# Patient Record
Sex: MALE | Race: WHITE | NOT HISPANIC OR LATINO | Employment: FULL TIME | URBAN - METROPOLITAN AREA
[De-identification: names, ages, dates, MRNs, and addresses within clinical notes are randomized per-mention and may not be internally consistent; named-entity substitution may affect disease eponyms.]

---

## 2022-08-02 ENCOUNTER — LAB (OUTPATIENT)
Dept: LAB | Facility: HOSPITAL | Age: 37
End: 2022-08-02

## 2022-08-02 ENCOUNTER — OFFICE VISIT (OUTPATIENT)
Dept: FAMILY MEDICINE CLINIC | Age: 37
End: 2022-08-02

## 2022-08-02 VITALS
WEIGHT: 237 LBS | HEART RATE: 86 BPM | HEIGHT: 69 IN | BODY MASS INDEX: 35.1 KG/M2 | DIASTOLIC BLOOD PRESSURE: 87 MMHG | OXYGEN SATURATION: 99 % | SYSTOLIC BLOOD PRESSURE: 126 MMHG

## 2022-08-02 DIAGNOSIS — Z13.220 SCREENING CHOLESTEROL LEVEL: ICD-10-CM

## 2022-08-02 DIAGNOSIS — F41.9 ANXIETY: ICD-10-CM

## 2022-08-02 DIAGNOSIS — R53.83 OTHER FATIGUE: Primary | ICD-10-CM

## 2022-08-02 DIAGNOSIS — R53.83 OTHER FATIGUE: ICD-10-CM

## 2022-08-02 DIAGNOSIS — F17.210 NICOTINE DEPENDENCE, CIGARETTES, UNCOMPLICATED: ICD-10-CM

## 2022-08-02 LAB
ALBUMIN SERPL-MCNC: 4.5 G/DL (ref 3.5–5.2)
ALBUMIN/GLOB SERPL: 2 G/DL
ALP SERPL-CCNC: 87 U/L (ref 39–117)
ALT SERPL W P-5'-P-CCNC: 24 U/L (ref 1–41)
ANION GAP SERPL CALCULATED.3IONS-SCNC: 13.4 MMOL/L (ref 5–15)
AST SERPL-CCNC: 17 U/L (ref 1–40)
BASOPHILS # BLD AUTO: 0.04 10*3/MM3 (ref 0–0.2)
BASOPHILS NFR BLD AUTO: 0.4 % (ref 0–1.5)
BILIRUB SERPL-MCNC: <0.2 MG/DL (ref 0–1.2)
BUN SERPL-MCNC: 12 MG/DL (ref 6–20)
BUN/CREAT SERPL: 12.1 (ref 7–25)
CALCIUM SPEC-SCNC: 9.2 MG/DL (ref 8.6–10.5)
CHLORIDE SERPL-SCNC: 101 MMOL/L (ref 98–107)
CHOLEST SERPL-MCNC: 230 MG/DL (ref 0–200)
CO2 SERPL-SCNC: 25.6 MMOL/L (ref 22–29)
CREAT SERPL-MCNC: 0.99 MG/DL (ref 0.76–1.27)
DEPRECATED RDW RBC AUTO: 39.9 FL (ref 37–54)
EGFRCR SERPLBLD CKD-EPI 2021: 100.6 ML/MIN/1.73
EOSINOPHIL # BLD AUTO: 0.38 10*3/MM3 (ref 0–0.4)
EOSINOPHIL NFR BLD AUTO: 3.9 % (ref 0.3–6.2)
ERYTHROCYTE [DISTWIDTH] IN BLOOD BY AUTOMATED COUNT: 13 % (ref 12.3–15.4)
GLOBULIN UR ELPH-MCNC: 2.3 GM/DL
GLUCOSE SERPL-MCNC: 91 MG/DL (ref 65–99)
HCT VFR BLD AUTO: 40.4 % (ref 37.5–51)
HDLC SERPL-MCNC: 27 MG/DL (ref 40–60)
HGB BLD-MCNC: 13.6 G/DL (ref 13–17.7)
IMM GRANULOCYTES # BLD AUTO: 0.03 10*3/MM3 (ref 0–0.05)
IMM GRANULOCYTES NFR BLD AUTO: 0.3 % (ref 0–0.5)
LDLC SERPL CALC-MCNC: 133 MG/DL (ref 0–100)
LDLC/HDLC SERPL: 4.65 {RATIO}
LYMPHOCYTES # BLD AUTO: 3.6 10*3/MM3 (ref 0.7–3.1)
LYMPHOCYTES NFR BLD AUTO: 37.2 % (ref 19.6–45.3)
MCH RBC QN AUTO: 28.2 PG (ref 26.6–33)
MCHC RBC AUTO-ENTMCNC: 33.7 G/DL (ref 31.5–35.7)
MCV RBC AUTO: 83.8 FL (ref 79–97)
MONOCYTES # BLD AUTO: 0.8 10*3/MM3 (ref 0.1–0.9)
MONOCYTES NFR BLD AUTO: 8.3 % (ref 5–12)
NEUTROPHILS NFR BLD AUTO: 4.84 10*3/MM3 (ref 1.7–7)
NEUTROPHILS NFR BLD AUTO: 49.9 % (ref 42.7–76)
NRBC BLD AUTO-RTO: 0 /100 WBC (ref 0–0.2)
PLATELET # BLD AUTO: 284 10*3/MM3 (ref 140–450)
PMV BLD AUTO: 12 FL (ref 6–12)
POTASSIUM SERPL-SCNC: 4 MMOL/L (ref 3.5–5.2)
PROT SERPL-MCNC: 6.8 G/DL (ref 6–8.5)
RBC # BLD AUTO: 4.82 10*6/MM3 (ref 4.14–5.8)
SODIUM SERPL-SCNC: 140 MMOL/L (ref 136–145)
TRIGL SERPL-MCNC: 387 MG/DL (ref 0–150)
TSH SERPL DL<=0.05 MIU/L-ACNC: 2.12 UIU/ML (ref 0.27–4.2)
VIT B12 BLD-MCNC: 727 PG/ML (ref 211–946)
VLDLC SERPL-MCNC: 70 MG/DL (ref 5–40)
WBC NRBC COR # BLD: 9.69 10*3/MM3 (ref 3.4–10.8)

## 2022-08-02 PROCEDURE — 80061 LIPID PANEL: CPT

## 2022-08-02 PROCEDURE — 36415 COLL VENOUS BLD VENIPUNCTURE: CPT

## 2022-08-02 PROCEDURE — 80050 GENERAL HEALTH PANEL: CPT

## 2022-08-02 PROCEDURE — 99203 OFFICE O/P NEW LOW 30 MIN: CPT | Performed by: NURSE PRACTITIONER

## 2022-08-02 PROCEDURE — 82607 VITAMIN B-12: CPT

## 2022-08-02 RX ORDER — BUPROPION HYDROCHLORIDE 150 MG/1
150 TABLET ORAL DAILY
Qty: 30 TABLET | Refills: 2 | Status: SHIPPED | OUTPATIENT
Start: 2022-08-02 | End: 2022-09-13 | Stop reason: SINTOL

## 2022-08-02 RX ORDER — HYDROXYZINE HYDROCHLORIDE 25 MG/1
25 TABLET, FILM COATED ORAL NIGHTLY PRN
Qty: 30 TABLET | Refills: 2 | Status: SHIPPED | OUTPATIENT
Start: 2022-08-02 | End: 2023-01-05

## 2022-08-02 NOTE — ASSESSMENT & PLAN NOTE
Does not recall doing well with Zoloft as a child.  That could be tolerated differently as an adult, however he does report history of ADD or ADHD and is a current smoker.  Discussed different treatment options including SSRI or Wellbutrin.  Elects to try Wellbutrin once daily extended release rather than starting with the regular release tablets.  He is to report any worsening of mood and follow-up in 6 weeks or sooner if concerns.  Encourage counseling and behavioral modification for management of anxiety and depression.  Warned of the rare occurrence of decreased seizure threshold when on Wellbutrin.  Hydroxyzine may be taken as needed at bedtime to assist with sleep.  I recommend that he not start both medicines in the same 24 to 48-hour.  So that if side effects were to occur would be easier to pinpoint the cause of the side effect.

## 2022-08-05 NOTE — PROGRESS NOTES
Normal blood sugar, kidney, liver and thyroid function.  Normal vitamin b12 and you are not anemic.  Cholesterol is mildly elevated but you were not fasting.  Minimize intake of cholesterol and saturated fat and increase exercise.  Obtain a fasting lipid panel at a future visit as follow up.

## 2022-09-13 ENCOUNTER — OFFICE VISIT (OUTPATIENT)
Dept: FAMILY MEDICINE CLINIC | Age: 37
End: 2022-09-13

## 2022-09-13 VITALS
HEIGHT: 69 IN | DIASTOLIC BLOOD PRESSURE: 86 MMHG | OXYGEN SATURATION: 96 % | HEART RATE: 88 BPM | WEIGHT: 232 LBS | BODY MASS INDEX: 34.36 KG/M2 | SYSTOLIC BLOOD PRESSURE: 125 MMHG

## 2022-09-13 DIAGNOSIS — F41.9 ANXIETY: Primary | ICD-10-CM

## 2022-09-13 DIAGNOSIS — Z30.09 CONSULTATION FOR STERILIZATION: ICD-10-CM

## 2022-09-13 PROCEDURE — 99213 OFFICE O/P EST LOW 20 MIN: CPT | Performed by: NURSE PRACTITIONER

## 2022-09-13 RX ORDER — FLUOXETINE 10 MG/1
10 TABLET, FILM COATED ORAL DAILY
Qty: 30 TABLET | Refills: 2 | Status: SHIPPED | OUTPATIENT
Start: 2022-09-13 | End: 2022-12-13 | Stop reason: DRUGHIGH

## 2022-09-13 NOTE — ASSESSMENT & PLAN NOTE
Intolerant to Wellbutrin.  Does not recall ever being on fluoxetine, Lexapro, Celexa, Effexor, Seroquel, or Abilify.  Recommend trying an SSRI such as fluoxetine.  Dose will likely need to be increased in a few weeks.  He is to let me know if any medication intolerance and he is to call with an update within 3 to 4 weeks to update me on his status.  Consider referral to [facility] for evaluation for adult ADD.

## 2022-09-13 NOTE — PROGRESS NOTES
"Chief Complaint  Fatigue (6 week follow up ), Anxiety (Discuss medication ), and Nicotine Dependence    Subjective  Patient is a 36-year-old male who is here today to follow-up regarding anxiety, decreased concentration, and anger issues.  He stopped Wellbutrin after 2 weeks due to to constipation.  States constipation resolved upon discontinuation of medication.  Did not do well on Ritalin as a child.  He recently did take a friend's Adderall and he felt that medication was helpful.  He declines a referral to [facility] for further evaluation for potential adult ADD.  Is not suicidal.  Denies any problems with sleep.  Did not do well with Zoloft as a child.  Is agreeable to trying another medication for anxiety.  He has not tried hydroxyzine yet.    Requests referral to urologist to discuss possible vasectomy.        Javier Fuentes presents to [facility]          Objective   Vital Signs:   Vitals:    09/13/22 1529   BP: 125/86   BP Location: Left arm   Patient Position: Sitting   Cuff Size: Large Adult   Pulse: 88   SpO2: 96%   Weight: 105 kg (232 lb)   Height: 175.3 cm (69\")      Body mass index is 34.26 kg/m².  Physical Exam  Vitals reviewed.   Constitutional:       General: He is not in acute distress.     Appearance: Normal appearance. He is well-developed.   Cardiovascular:      Rate and Rhythm: Normal rate and regular rhythm.      Heart sounds: Normal heart sounds.   Pulmonary:      Effort: Pulmonary effort is normal.      Breath sounds: Normal breath sounds.   Musculoskeletal:      Right lower leg: No edema.      Left lower leg: No edema.   Skin:     General: Skin is warm and dry.   Neurological:      General: No focal deficit present.      Mental Status: He is alert.   Psychiatric:         Attention and Perception: Attention normal.         Mood and Affect: Mood and affect normal.         Behavior: Behavior normal.          Result Review :     CMP    CMP 8/2/22 "   Glucose 91   BUN 12   Creatinine 0.99   Sodium 140   Potassium 4.0   Chloride 101   Calcium 9.2   Albumin 4.50   Total Bilirubin <0.2   Alkaline Phosphatase 87   AST (SGOT) 17   ALT (SGPT) 24           CBC    CBC 8/2/22   WBC 9.69   RBC 4.82   Hemoglobin 13.6   Hematocrit 40.4   MCV 83.8   MCH 28.2   MCHC 33.7   RDW 13.0   Platelets 284           CBC w/diff    CBC w/Diff 8/2/22   WBC 9.69   RBC 4.82   Hemoglobin 13.6   Hematocrit 40.4   MCV 83.8   MCH 28.2   MCHC 33.7   RDW 13.0   Platelets 284   Neutrophil Rel % 49.9   Immature Granulocyte Rel % 0.3   Lymphocyte Rel % 37.2   Monocyte Rel % 8.3   Eosinophil Rel % 3.9   Basophil Rel % 0.4           Lipid Panel    Lipid Panel 8/2/22   Total Cholesterol 230 (A)   Triglycerides 387 (A)   HDL Cholesterol 27 (A)   VLDL Cholesterol 70 (A)   LDL Cholesterol  133 (A)   LDL/HDL Ratio 4.65   (A) Abnormal value            TSH    TSH 8/2/22   TSH 2.120                    Assessment and Plan    Diagnoses and all orders for this visit:    1. Anxiety (Primary)  Assessment & Plan:  Intolerant to Wellbutrin.  Does not recall ever being on fluoxetine, Lexapro, Celexa, Effexor, Seroquel, or Abilify.  Recommend trying an SSRI such as fluoxetine.  Dose will likely need to be increased in a few weeks.  He is to let me know if any medication intolerance and he is to call with an update within 3 to 4 weeks to update me on his status.  Consider referral to [facility] for evaluation for adult ADD.    Orders:  -     FLUoxetine (PROzac) 10 MG tablet; Take 1 tablet by mouth Daily for 90 days.  Dispense: 30 tablet; Refill: 2    2. Consultation for sterilization  -     Ambulatory Referral to Urology      Follow Up    Return in about 3 months (around 12/13/2022).  Patient was given instructions and counseling regarding his condition or for health maintenance advice. Please see specific information pulled into the AVS if appropriate.

## 2022-12-13 ENCOUNTER — OFFICE VISIT (OUTPATIENT)
Dept: FAMILY MEDICINE CLINIC | Age: 37
End: 2022-12-13

## 2022-12-13 VITALS
HEART RATE: 95 BPM | SYSTOLIC BLOOD PRESSURE: 128 MMHG | WEIGHT: 224 LBS | BODY MASS INDEX: 33.18 KG/M2 | DIASTOLIC BLOOD PRESSURE: 88 MMHG | HEIGHT: 69 IN | OXYGEN SATURATION: 93 %

## 2022-12-13 DIAGNOSIS — F41.9 ANXIETY: Primary | ICD-10-CM

## 2022-12-13 PROBLEM — F17.210 NICOTINE DEPENDENCE, CIGARETTES, UNCOMPLICATED: Status: RESOLVED | Noted: 2022-08-02 | Resolved: 2022-12-13

## 2022-12-13 PROCEDURE — 99213 OFFICE O/P EST LOW 20 MIN: CPT | Performed by: NURSE PRACTITIONER

## 2022-12-13 RX ORDER — FLUOXETINE HYDROCHLORIDE 20 MG/1
CAPSULE ORAL
Qty: 60 CAPSULE | Refills: 2 | Status: SHIPPED | OUTPATIENT
Start: 2022-12-13 | End: 2023-01-05

## 2022-12-13 NOTE — ASSESSMENT & PLAN NOTE
Agreeable to increasing dose of fluoxetine.  To refer to specialist to start evaluation for ADD.  Follow up if symptoms are not improving.

## 2022-12-13 NOTE — PROGRESS NOTES
"Chief Complaint  Anxiety (3 month follow up - discuss medication states it did not help )    Subjective          Javier Fuentes presents to [facility]     patient is a 37 yr old male here for follow up on anxiety and decreased focus and concentration.  Delayed onset of sleep due to \"cannot turn mind off.\"  Concerned he has ADD and had good response to friends adderall or ritalin.  Intolerant to wellbutrin (constipation) and did not do well with zoloft as a child.  Has not had formal evaluation for ADD.  Has had 4 monster energy drinks today.  Denies side effects of energy drinks. He stopped fluoxetine 10 mg daily due to ineffectiveness.   Once he falls asleep he stays asleep without difficulty and often does no hear his alarm in the morning.  Therefore, he did not start hydroxyzine.    Objective   Vital Signs:   Vitals:    12/13/22 1538   BP: 128/88   BP Location: Left arm   Patient Position: Sitting   Cuff Size: Large Adult   Pulse: 95   SpO2: 93%   Weight: 102 kg (224 lb)   Height: 175.3 cm (69\")      Body mass index is 33.08 kg/m².  Physical Exam  Vitals reviewed.   Constitutional:       General: He is not in acute distress.     Appearance: Normal appearance. He is well-developed.   Cardiovascular:      Rate and Rhythm: Normal rate and regular rhythm.      Heart sounds: Normal heart sounds.   Pulmonary:      Effort: Pulmonary effort is normal.      Breath sounds: Normal breath sounds.   Musculoskeletal:      Right lower leg: No edema.      Left lower leg: No edema.   Skin:     General: Skin is warm and dry.   Neurological:      General: No focal deficit present.      Mental Status: He is alert.   Psychiatric:         Attention and Perception: Attention normal.         Mood and Affect: Mood and affect normal.         Behavior: Behavior normal.           Current Outpatient Medications:   •  FLUoxetine (PROzac) 20 MG capsule, Take one capsule daily x 2 weeks then increase to 2 " capsules daily, Disp: 60 capsule, Rfl: 2  •  hydrOXYzine (ATARAX) 25 MG tablet, Take 1 tablet by mouth At Night As Needed for Itching., Disp: 30 tablet, Rfl: 2       Result Review :     CMP    CMP 8/2/22   Glucose 91   BUN 12   Creatinine 0.99   Sodium 140   Potassium 4.0   Chloride 101   Calcium 9.2   Albumin 4.50   Total Bilirubin <0.2   Alkaline Phosphatase 87   AST (SGOT) 17   ALT (SGPT) 24           CBC    CBC 8/2/22   WBC 9.69   RBC 4.82   Hemoglobin 13.6   Hematocrit 40.4   MCV 83.8   MCH 28.2   MCHC 33.7   RDW 13.0   Platelets 284           CBC w/diff    CBC w/Diff 8/2/22   WBC 9.69   RBC 4.82   Hemoglobin 13.6   Hematocrit 40.4   MCV 83.8   MCH 28.2   MCHC 33.7   RDW 13.0   Platelets 284   Neutrophil Rel % 49.9   Immature Granulocyte Rel % 0.3   Lymphocyte Rel % 37.2   Monocyte Rel % 8.3   Eosinophil Rel % 3.9   Basophil Rel % 0.4           TSH    TSH 8/2/22   TSH 2.120                    Assessment and Plan    Diagnoses and all orders for this visit:    1. Anxiety (Primary)  Assessment & Plan:  Agreeable to increasing dose of fluoxetine.  To refer to specialist to start evaluation for ADD.  Follow up if symptoms are not improving.    Orders:  -     FLUoxetine (PROzac) 20 MG capsule; Take one capsule daily x 2 weeks then increase to 2 capsules daily  Dispense: 60 capsule; Refill: 2  -     Ambulatory Referral to Psychiatry      Follow Up    No follow-ups on file.  Patient was given instructions and counseling regarding his condition or for health maintenance advice. Please see specific information pulled into the AVS if appropriate.

## 2023-01-05 ENCOUNTER — CLINICAL SUPPORT (OUTPATIENT)
Dept: FAMILY MEDICINE CLINIC | Age: 38
End: 2023-01-05
Payer: COMMERCIAL

## 2023-01-05 ENCOUNTER — OFFICE VISIT (OUTPATIENT)
Dept: BEHAVIORAL HEALTH | Facility: CLINIC | Age: 38
End: 2023-01-05
Payer: COMMERCIAL

## 2023-01-05 VITALS
DIASTOLIC BLOOD PRESSURE: 89 MMHG | BODY MASS INDEX: 33.36 KG/M2 | WEIGHT: 225.2 LBS | SYSTOLIC BLOOD PRESSURE: 140 MMHG | HEART RATE: 87 BPM | HEIGHT: 69 IN

## 2023-01-05 DIAGNOSIS — F90.0 ADHD (ATTENTION DEFICIT HYPERACTIVITY DISORDER), INATTENTIVE TYPE: Primary | ICD-10-CM

## 2023-01-05 DIAGNOSIS — Z79.899 HIGH RISK MEDICATION USE: ICD-10-CM

## 2023-01-05 DIAGNOSIS — F41.9 ANXIETY: Primary | ICD-10-CM

## 2023-01-05 DIAGNOSIS — F41.1 GENERALIZED ANXIETY DISORDER: ICD-10-CM

## 2023-01-05 DIAGNOSIS — F90.0 ADHD (ATTENTION DEFICIT HYPERACTIVITY DISORDER), INATTENTIVE TYPE: ICD-10-CM

## 2023-01-05 DIAGNOSIS — Z79.899 CONTROLLED SUBSTANCE AGREEMENT SIGNED: ICD-10-CM

## 2023-01-05 LAB
AMPHET+METHAMPHET UR QL: NEGATIVE
AMPHETAMINES UR QL: NEGATIVE
BARBITURATES UR QL SCN: NEGATIVE
BENZODIAZ UR QL SCN: NEGATIVE
BUPRENORPHINE SERPL-MCNC: NEGATIVE NG/ML
CANNABINOIDS SERPL QL: NEGATIVE
COCAINE UR QL: NEGATIVE
EXPIRATION DATE: NORMAL
Lab: NORMAL
MDMA UR QL SCN: NEGATIVE
METHADONE UR QL SCN: NEGATIVE
OPIATES UR QL: NEGATIVE
OXYCODONE UR QL SCN: NEGATIVE
PCP UR QL SCN: NEGATIVE

## 2023-01-05 PROCEDURE — 90792 PSYCH DIAG EVAL W/MED SRVCS: CPT | Performed by: NURSE PRACTITIONER

## 2023-01-05 PROCEDURE — 80305 DRUG TEST PRSMV DIR OPT OBS: CPT | Performed by: NURSE PRACTITIONER

## 2023-01-05 RX ORDER — DEXTROAMPHETAMINE SACCHARATE, AMPHETAMINE ASPARTATE MONOHYDRATE, DEXTROAMPHETAMINE SULFATE AND AMPHETAMINE SULFATE 2.5; 2.5; 2.5; 2.5 MG/1; MG/1; MG/1; MG/1
10 CAPSULE, EXTENDED RELEASE ORAL EVERY MORNING
Qty: 30 CAPSULE | Refills: 0 | Status: SHIPPED | OUTPATIENT
Start: 2023-01-06 | End: 2023-02-05

## 2023-01-05 NOTE — PROGRESS NOTES
Confirmed results of negative, will proceed with starting Adderall as discussed during today's visit.
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
## 2023-01-05 NOTE — PATIENT INSTRUCTIONS
1.  Please return to clinic at your next scheduled visit.  Contact the [facility] ([phone]) or **Alison, Medical Assistant at [place] Office directly at [phone] at least 24 hours prior in the event you need to cancel.**    2. Should you want to get in touch with your provider, ALBERTINA Baeza, please contact MY Medical Assistant, Alison, directly at [phone].  Recommend saving Alison's direct number in phone as this is the PREFERRED & EASIEST way to get in contact with your provider.  Please leave a voice mail if you do not get an answer and she will return your call within 24 hrs. You will NOT be able to contact provider on [facility], as Behavioral Health Providers are restricted. YOU MUST CALL [phone]    If you need to speak with the on call provider after hours or on weekends, please Contact the [facility] ([phone]) and staff will be able to page the provider on call directly.        3, MEDICATION REFILLS:  PLEASE CALL THE PHARMACY TO REQUEST ALL MEDICATION REFILLS TO ENSURE YOU ARE RECEIVING YOUR MEDICATIONS IN A TIMELY MANNER.    IF YOU USE AN AUTOMATED SERVICE AT THE PHARMACY FOR REFILLS AND ARE TOLD THERE ARE \"NO REFILLS REMAINING\"   PLEASE CALL THE PHARMACY & SPEAK TO A LIVE PERSON TO VERIFY IT IS THE MOST UP TO DATE PRESCRIPTION ON FILE.    All new prescriptions will have a different number, therefore, if you were given refills for a medication today or at last visit it will not have the same number as the previous prescription.       4.  In the event you have personal crisis, contact the following crisis numbers: Suicide Prevention Hotline 1-966.371.7524 or *988, JIE Helpline 9-782-023-JIE; [facility] Emergency Room [phone]; text HELLO to 764523; or 360.      5. We would appreciate your feedback, please scan the QRS code on the back of your appointment card (or see below) and complete a brief survey.  [place] location is still not available, so  please click \"[place]\" location.  Thank you      SPECIFIC RECOMMENDATIONS:     1.      Medications discussed at this encounter:                   - Will potentially start Adderall XR 10 mg by mouth daily in the morning   Will order once urine drug screen resulted as negative      2.      Psychotherapy recommendations: n/a      3.     Return to clinic: 4 weeks    Please arrive at least 15 minutes before your scheduled appointment time to complete check in process.      IF you are scheduled for a [employer] [facility] visit, PLEASE ANSWER YOUR PHONE WHEN OFFICE CALLS PRIOR TO VISIT TO COMPLETE THE CHECK IN PROCESS, EVEN IF THE E-CHECK IN WAS COMPLETED.     If you would like to log on to [employer] and complete the \"E-Check IN\" prior to your visit, please do so, this will speed up the check in process.  If you are due for questionnaires, you will find those on [employer] as well, please try to complete prior to your scheduled appointment.

## 2023-02-06 ENCOUNTER — TELEPHONE (OUTPATIENT)
Dept: BEHAVIORAL HEALTH | Facility: CLINIC | Age: 38
End: 2023-02-06

## 2023-02-21 ENCOUNTER — TELEPHONE (OUTPATIENT)
Dept: BEHAVIORAL HEALTH | Facility: CLINIC | Age: 38
End: 2023-02-21
Payer: COMMERCIAL

## 2023-02-21 NOTE — TELEPHONE ENCOUNTER
Patient sent a My Chart Message for refill on his Adderall to Ana Luisa Schneider please advise  Patient has upcoming appt with   Neelima Barcenas for 03/21/2023.

## 2023-02-21 NOTE — TELEPHONE ENCOUNTER
Adderall XR 10 mg was dispensed on 1/5/23 #30/30 days, and patient was scheduled for a 4 week follow up for 2/6/23, however, provider had to leave office that day, a message was left for patient informing and requested a return call to reschedule.  Patient was scheduled on 2/10/23 by the [place] office for 3/21/23.  Unfortunately, since patient was just started on Adderall XR 10 mg he will need to be seen sooner, once rescheduled for an earlier appointment please let me know so a short supply can be sent to the pharmacy.  Please remind patient of refill request process as he should request refill from the pharmacy and the refill will be sent to provider, and behavioral health providers cannot be reached via [employer] message and patient is to contact MA directly at contact number given previously, [phone].

## 2023-02-23 NOTE — TELEPHONE ENCOUNTER
Called [facility] for patient to return my call to get scheduled for next week with Neelima so that she can address the refill

## 2023-03-02 ENCOUNTER — OFFICE VISIT (OUTPATIENT)
Dept: BEHAVIORAL HEALTH | Facility: CLINIC | Age: 38
End: 2023-03-02
Payer: COMMERCIAL

## 2023-03-02 VITALS
DIASTOLIC BLOOD PRESSURE: 86 MMHG | HEART RATE: 92 BPM | WEIGHT: 218.8 LBS | HEIGHT: 69 IN | SYSTOLIC BLOOD PRESSURE: 129 MMHG | BODY MASS INDEX: 32.41 KG/M2

## 2023-03-02 DIAGNOSIS — F90.0 ADHD (ATTENTION DEFICIT HYPERACTIVITY DISORDER), INATTENTIVE TYPE: Primary | ICD-10-CM

## 2023-03-02 PROCEDURE — 99213 OFFICE O/P EST LOW 20 MIN: CPT | Performed by: NURSE PRACTITIONER

## 2023-03-02 RX ORDER — DEXTROAMPHETAMINE SACCHARATE, AMPHETAMINE ASPARTATE MONOHYDRATE, DEXTROAMPHETAMINE SULFATE AND AMPHETAMINE SULFATE 2.5; 2.5; 2.5; 2.5 MG/1; MG/1; MG/1; MG/1
CAPSULE, EXTENDED RELEASE ORAL DAILY
COMMUNITY
Start: 2023-01-05 | End: 2023-03-02 | Stop reason: SDUPTHER

## 2023-03-02 RX ORDER — DEXTROAMPHETAMINE SACCHARATE, AMPHETAMINE ASPARTATE MONOHYDRATE, DEXTROAMPHETAMINE SULFATE AND AMPHETAMINE SULFATE 2.5; 2.5; 2.5; 2.5 MG/1; MG/1; MG/1; MG/1
10 CAPSULE, EXTENDED RELEASE ORAL DAILY
Qty: 30 CAPSULE | Refills: 0 | Status: SHIPPED | OUTPATIENT
Start: 2023-03-02 | End: 2023-03-07 | Stop reason: RX
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
## 2023-03-02 NOTE — PATIENT INSTRUCTIONS
"1.  Please return to clinic at your next scheduled visit.  Contact the [facility] ([phone]) or **Alison, Medical Assistant at [place] Office directly at [phone] at least 24 hours prior in the event you need to cancel.**    2. Should you want to get in touch with your provider, ALBERTINA Baeza, please contact MY Medical Assistant, Alison, directly at [phone].  Recommend saving Alison's direct number in phone as this is the PREFERRED & EASIEST way to get in contact with your provider.  Please leave a voice mail if you do not get an answer and she will return your call within 24 hrs. You will NOT be able to contact provider on [facility], as Behavioral Health Providers are restricted. YOU MUST CALL [phone]    If you need to speak with the on call provider after hours or on weekends, please Contact the [facility] ([phone]) and staff will be able to page the provider on call directly.        3, MEDICATION REFILLS:  PLEASE CALL THE PHARMACY TO REQUEST ALL MEDICATION REFILLS TO ENSURE YOU ARE RECEIVING YOUR MEDICATIONS IN A TIMELY MANNER.    IF YOU USE AN AUTOMATED SERVICE AT THE PHARMACY FOR REFILLS AND ARE TOLD THERE ARE \"NO REFILLS REMAINING\"   PLEASE CALL THE PHARMACY & SPEAK TO A LIVE PERSON TO VERIFY IT IS THE MOST UP TO DATE PRESCRIPTION ON FILE.    All new prescriptions will have a different number, therefore, if you were given refills for a medication today or at last visit it will not have the same number as the previous prescription.       4.  In the event you have personal crisis, contact the following crisis numbers: Suicide Prevention Hotline 1-279.878.8100 or *988, JIE Helpline 9-178-698-JIE; [facility] Emergency Room [phone]; text HELLO to 528331; or 999.      5. We would appreciate your feedback, please scan the QRS code on the back of your appointment card (or see below) and complete a brief survey.  [place] location is still not available, so " "please click \"[place]\" location.  Thank you      SPECIFIC RECOMMENDATIONS:     1.      Medications discussed at this encounter:                   - Continue Adderall XR 10 mg, new prescription will be sent in on separate encounter and will not be seen on after visit summary today.    Request refill 3-5 days before running out.      2.      Psychotherapy recommendations: Declined     3.     Return to clinic: 6 weeks    Please arrive at least 15 minutes before your scheduled appointment time to complete check in process.      IF you are scheduled for a [facility] visit, PLEASE ANSWER YOUR PHONE WHEN OFFICE CALLS PRIOR TO VISIT TO COMPLETE THE CHECK IN PROCESS, EVEN IF THE E-CHECK IN WAS COMPLETED.     If you would like to log on to [facility] and complete the \"E-Check IN\" prior to your visit, please do so, this will speed up the check in process.  If you are due for questionnaires, you will find those on [facility] as well, please try to complete prior to your scheduled appointment.          "

## 2023-03-06 ENCOUNTER — OFFICE VISIT (OUTPATIENT)
Dept: UROLOGY | Facility: CLINIC | Age: 38
End: 2023-03-06
Payer: COMMERCIAL

## 2023-03-06 VITALS — HEIGHT: 69 IN | RESPIRATION RATE: 19 BRPM | WEIGHT: 218.4 LBS | BODY MASS INDEX: 32.35 KG/M2

## 2023-03-06 DIAGNOSIS — Z30.09 STERILIZATION CONSULT: Primary | ICD-10-CM

## 2023-03-06 PROCEDURE — 99203 OFFICE O/P NEW LOW 30 MIN: CPT | Performed by: UROLOGY

## 2023-03-06 NOTE — PROGRESS NOTES
Chief Complaint: Undesired fertility         History of Present Illness:  Javier Fuentes is a 37 y.o. male presents for counseling regarding vasectomy for permanent sterilization. The procedure was discussed with the patient. Javier Fuentes is aware that the procedure should be considered irreversible, although at times it can be reversed with success. Risks for the procedure including local effects of swelling, bleeding, pain, the possibility for recanalization, and continued fertility is also possible. Formation of a sperm granuloma also is a possibility. We discussed the procedure, preoperative preparation, and postoperative care. We also discussed the importance of continuing to use contraception post vasectomy, since the patient will not be sterile at that point. We stressed the importance of continuing to use contraception until a follow-up semen specimen is done that shows the absence of sperm. That specimen will normally be obtained eight weeks post-surgery. Javier Fuentes is voluntarily requesting the procedure and understands that if it is successful he will be unable to father children.     No anticoagulation, no previous scrotal surgery, no cardiopulmonary history    Alert and oriented x3  No acute distress  Unlabored respirations  Nontender/nondistended  Normal circumcised phallus, bilateral descended testicles without mass.  Bilateral vas deferens palpable  Grossly oriented to person place and time judgment is intact      Assessment and Plan      Consult for sterilization      Plan    Instructions  • The patient will schedule a vasectomy if he desires.   • Handouts were provided, vasectomy  scanned into the EMR for reference.   • Vasectomy is intended to be a permanent form of contraception.   • Vasectomy does not produce immediate sterility.   • Following vasectomy, another form of contraception is required until vas occlusion is confirmed by post-vasectomy semen analysis (PVSA).   • Even after vas  occlusion is confirmed, vasectomy is not 100% reliable in preventing pregnancy.   • The risk of pregnancy after vasectomy is approximately 1 in 2,000 for men who have no sperm in the semen on post-vasectomy semen analysis showing rare non-motile sperm (RNMS).   • Repeat vasectomy is necessary in <1% of vasectomies, provided that a technique for vas occlusion known to have low occlusive failure rate has been used.   • Patient's should refrain from ejaculation for approximately 1 week after vasectomy.   • Options for fertility after vasectomy reversal and sperm retrieval with in vitro fertilization. These options are not always successful, and are very expensive.   • The rates of surgical complications such as symptomatic hematoma and infection are 1-2%  • Chronic scrotal pain associated with negative impact on quality of life occurs after vasectomy in about 1-2% of men. Few of these men require additional surgery.   • Other permanent and non-permanent alternatives to vasectomy are available.  • Patient voiced understanding of the above statements.   • Electronically identified patient education materials provided electronically    Patient has decided to schedule a vasectomy.

## 2023-03-07 ENCOUNTER — TELEPHONE (OUTPATIENT)
Dept: BEHAVIORAL HEALTH | Facility: CLINIC | Age: 38
End: 2023-03-07
Payer: COMMERCIAL

## 2023-03-07 DIAGNOSIS — F90.0 ADHD (ATTENTION DEFICIT HYPERACTIVITY DISORDER), INATTENTIVE TYPE: ICD-10-CM

## 2023-03-07 RX ORDER — DEXTROAMPHETAMINE SACCHARATE, AMPHETAMINE ASPARTATE MONOHYDRATE, DEXTROAMPHETAMINE SULFATE AND AMPHETAMINE SULFATE 2.5; 2.5; 2.5; 2.5 MG/1; MG/1; MG/1; MG/1
10 CAPSULE, EXTENDED RELEASE ORAL EVERY MORNING
Qty: 30 CAPSULE | Refills: 0 | Status: SHIPPED | OUTPATIENT
Start: 2023-03-07 | End: 2023-04-04 | Stop reason: SDUPTHER

## 2023-03-07 NOTE — TELEPHONE ENCOUNTER
Patient called to request to have his Adderall XR 10mg  Sent to a different pharmacy due to [facility] only having Brand name Adderall which patient says he cannot afford.  Please send as generic to [facility] in [facility] (patient says they have generic in stock).  I changed pharmacy in chart.

## 2023-04-04 DIAGNOSIS — F90.0 ADHD (ATTENTION DEFICIT HYPERACTIVITY DISORDER), INATTENTIVE TYPE: ICD-10-CM

## 2023-04-04 RX ORDER — DEXTROAMPHETAMINE SACCHARATE, AMPHETAMINE ASPARTATE MONOHYDRATE, DEXTROAMPHETAMINE SULFATE AND AMPHETAMINE SULFATE 2.5; 2.5; 2.5; 2.5 MG/1; MG/1; MG/1; MG/1
10 CAPSULE, EXTENDED RELEASE ORAL EVERY MORNING
Qty: 30 CAPSULE | Refills: 0 | Status: SHIPPED | OUTPATIENT
Start: 2023-04-05

## 2023-04-13 ENCOUNTER — OFFICE VISIT (OUTPATIENT)
Dept: BEHAVIORAL HEALTH | Facility: CLINIC | Age: 38
End: 2023-04-13
Payer: COMMERCIAL

## 2023-04-13 VITALS
BODY MASS INDEX: 33.03 KG/M2 | SYSTOLIC BLOOD PRESSURE: 126 MMHG | HEART RATE: 92 BPM | WEIGHT: 223 LBS | DIASTOLIC BLOOD PRESSURE: 85 MMHG | HEIGHT: 69 IN

## 2023-04-13 DIAGNOSIS — F90.0 ADHD (ATTENTION DEFICIT HYPERACTIVITY DISORDER), INATTENTIVE TYPE: Primary | ICD-10-CM

## 2023-04-13 PROCEDURE — 99213 OFFICE O/P EST LOW 20 MIN: CPT | Performed by: NURSE PRACTITIONER

## 2023-04-13 NOTE — PATIENT INSTRUCTIONS
"1. Should you want to get in touch with your provider, ALBERTINA Baeza, please contact MY Medical Assistant, Alison, directly at [phone].  Recommend saving Alison's direct number in phone as this is the PREFERRED & EASIEST way to get in contact with your provider.  Please leave a voice mail if you do not get an answer and she will return your call within 24 hrs. You will NOT be able to contact provider on [facility], as Behavioral Health Providers are restricted. YOU MUST CALL [phone]    If you need to speak with the on call provider after hours or on weekends, please Contact the [facility] ([phone]) and staff will be able to page the provider on call directly.      2.  In the event you need to cancel an appointment, please notify the office at least 24 hrs prior:   Contact **Alison Medical Assistant at [facility] directly at [phone] or the [facility] ([phone])       3, MEDICATION REFILLS:  PLEASE CALL THE PHARMACY TO REQUEST ALL MEDICATION REFILLS TO ENSURE YOU ARE RECEIVING YOUR MEDICATIONS IN A TIMELY MANNER. The pharmacy will send this request ELECTRONICALLY to the ordering provider.     IF YOU USE AN AUTOMATED SERVICE AT THE PHARMACY FOR REFILLS AND ARE TOLD THERE ARE \"NO REFILLS REMAINING\"   PLEASE CALL THE PHARMACY & SPEAK TO A LIVE PERSON TO VERIFY IT IS THE MOST UP TO DATE PRESCRIPTION ON FILE.    All new prescriptions will have a different number, therefore, if you were given refills for a medication today or at last visit it will not have the same number as the previous prescription.       4.  In the event you have personal crisis, contact the following crisis numbers: Suicide Prevention Hotline 1-192.559.8526 or *988, JIE Helpline 0-129-450-JIE; [facility] Emergency Room [phone]; text HELLO to 501962; or 587.      5. We would appreciate your feedback, please scan the QRS code on the back of your appointment card (or see below) and complete " "a brief survey.  [place] location is still not available, so please click \"[place]\" location.  Thank you      SPECIFIC RECOMMENDATIONS:     1.      Medications discussed at this encounter:                   - no changes, try to place sticky note on pill bottle and take as you are leaving for work     2.      Psychotherapy recommendations: Declined     3.     Return to clinic: 6 weeks    Please arrive at least 15 minutes before your scheduled appointment time to complete check in process.      IF you are scheduled for a [facility] VIDEO visit, YOU MUST COMPLETE THE \"E-CHECK IN\" PROCESS PRIOR TO BEGINNING THE VISIT, YOU WILL NO LONGER RECEIVE A PHONE CALL PRIOR TO ALL VIDEO VISITS          "
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
## 2023-04-14 ENCOUNTER — TELEPHONE (OUTPATIENT)
Dept: BEHAVIORAL HEALTH | Facility: CLINIC | Age: 38
End: 2023-04-14
Payer: COMMERCIAL

## 2023-04-14 NOTE — TELEPHONE ENCOUNTER
Called [facility] for patient to return call and make follow up appt with Neelima (6 week follow up) around last week of May or 1st part of June.

## 2023-05-08 ENCOUNTER — PROCEDURE VISIT (OUTPATIENT)
Dept: UROLOGY | Facility: CLINIC | Age: 38
End: 2023-05-08
Payer: COMMERCIAL

## 2023-05-08 DIAGNOSIS — Z30.2 STERILIZATION: Primary | ICD-10-CM

## 2023-05-08 PROCEDURE — 55250 REMOVAL OF SPERM DUCT(S): CPT | Performed by: UROLOGY

## 2023-05-08 NOTE — PROGRESS NOTES
Vasectomy Procedure    Procedure: Vasectomy     Pre-procedure Diagnosis: Undesired Fertility    Post-procedure Diagnosis: Same    Surgeon: Evelio Garcia MD    Anesthesia: Local, 10 cc of 1% Lidocaine    Indications  with undesired fertility, who presents today for vasectomy for permanent contraception.     Procedure Details:     The patient was appropriately identified, brought into the procedure room, and placed supine on the procedure table. A time was undertaken documenting the correct patient, site and procedure. His scrotum was prepped and draped in the standard sterile fashion. We first approached the right vas deferens. This was identified,  from the spermatic cord structures, and brought to the anterolateral aspect of the hemiscrotum. The local anaesthetic solution was injected and once an adequate level of local anesthesia was achieved, a scalpel was used to make a 1 cm incision in the skin overlying the vas deferens at the midline. A sharp hemostat was used to dissect the level of the vas deferens and on both of its sides, allowing for ring forceps to be introduced and grasp the vas deferens and externalize it through the skin incision. We then used a combination of sharp and blunt dissection to release the exposed vasal segment from all surrounding tissues. An approximately 1 cm piece of vas deferens was then sharply excised and removed. One blade of a sharp hemostat was used to probe each end of the severed vas deferens, confirming the presence of a vasal lumen. Electrocautery was then used to fulgurate both cut surfaces of the severed vas deferens. The abdominal side of the vas deferens was then placed underneath some perivasal tissues that were closed above it, using a figure-of-eight 3-0 chromic stitch, thus placing the two edges of the severed vas deferens in different tissue planes. Hemostasis was confirmed and the severed vas deferens was allowed to drop back into the scrotum.  We then  turned our attention to the left vas deferens. This was also identified and brought under the same midline incision. Local anesthetic was then delivered on this side around the vas deferens. An identical procedure was then carried out on the left vas deferens. Wound was dressed with bacitracin ointment and folded 4x4 gauze. The patient tolerated the procedure well and there were no intraoperative or immediate postoperative complications.     No intraprocedural complications    Blood loss 000    Plan:    1. Continue contraception until negative sperm analysis. Semen count in 10 weeks  2. Warning signs of infection were reviewed.   3. Patient is taken home by  with written home care instructions.  • Bedrest X 48 hrs, Ice pack every 3 hours for 24 hrs.    • Call the clinic if excessive pain, bleeding or swelling.  • Light duty for 2 weeks    Patient voiced understanding.    Electronically Signed by Evelio Garcia MD on 05/08/2023

## 2023-05-23 ENCOUNTER — TELEPHONE (OUTPATIENT)
Dept: BEHAVIORAL HEALTH | Facility: CLINIC | Age: 38
End: 2023-05-23
Payer: COMMERCIAL

## 2023-05-23 NOTE — TELEPHONE ENCOUNTER
Patient called to request refill on his Adderall XR 10mg be sent in to [facility].  Please review

## 2023-05-23 NOTE — TELEPHONE ENCOUNTER
Patient needs to be scheduled for a follow up visit, last seen 4/13/23 and was to return call to schedule around 5/25/23.

## 2023-05-24 NOTE — TELEPHONE ENCOUNTER
Called [facility] for callback to schedule an appt.  First available now is not until July,11,2023.

## 2023-05-26 NOTE — TELEPHONE ENCOUNTER
No return phone call to schedule follow up as of yet.  Closing encounter and will await call back to schedule.

## 2023-08-07 ENCOUNTER — OFFICE VISIT (OUTPATIENT)
Dept: UROLOGY | Facility: CLINIC | Age: 38
End: 2023-08-07
Payer: COMMERCIAL

## 2023-08-07 DIAGNOSIS — Z30.2 STERILIZATION: Primary | ICD-10-CM

## 2023-08-07 PROCEDURE — 99024 POSTOP FOLLOW-UP VISIT: CPT | Performed by: UROLOGY

## 2023-08-07 NOTE — PROGRESS NOTES
Subjective         History of Present Illness:  Javier Fuentes is a 37 y.o. male who is here status post vasectomy. 0 sperm noted on a #20 power fields.         Assessment and Plan     Undesired fertility    Medications  Medications have been reconciled.       Instructions    [x]   Instructed patient to follow-up as needed, counseled that he is okay to stop using birth control.    []   Patient to follow-up in 1 month for recheck, patient counseled to continue use birth control as he is still considered fertile and able to father children until recheck and given the okay to stop using birth control at that time. Patient voiced understanding.         Evelio Garcia MD